# Patient Record
Sex: FEMALE | Race: WHITE | NOT HISPANIC OR LATINO | Employment: FULL TIME | URBAN - METROPOLITAN AREA
[De-identification: names, ages, dates, MRNs, and addresses within clinical notes are randomized per-mention and may not be internally consistent; named-entity substitution may affect disease eponyms.]

---

## 2019-01-28 ENCOUNTER — APPOINTMENT (OUTPATIENT)
Dept: RADIOLOGY | Facility: CLINIC | Age: 34
End: 2019-01-28
Payer: OTHER MISCELLANEOUS

## 2019-01-28 ENCOUNTER — TRANSCRIBE ORDERS (OUTPATIENT)
Dept: URGENT CARE | Facility: CLINIC | Age: 34
End: 2019-01-28

## 2019-01-28 ENCOUNTER — APPOINTMENT (OUTPATIENT)
Dept: URGENT CARE | Facility: CLINIC | Age: 34
End: 2019-01-28
Payer: OTHER MISCELLANEOUS

## 2019-01-28 DIAGNOSIS — S29.9XXA CHEST INJURY, INITIAL ENCOUNTER: Primary | ICD-10-CM

## 2019-01-28 DIAGNOSIS — S29.9XXA CHEST INJURY, INITIAL ENCOUNTER: ICD-10-CM

## 2019-01-28 PROCEDURE — G0382 LEV 3 HOSP TYPE B ED VISIT: HCPCS | Performed by: EMERGENCY MEDICINE

## 2019-01-28 PROCEDURE — 71120 X-RAY EXAM BREASTBONE 2/>VWS: CPT

## 2019-01-28 PROCEDURE — 99283 EMERGENCY DEPT VISIT LOW MDM: CPT | Performed by: EMERGENCY MEDICINE

## 2019-02-02 ENCOUNTER — APPOINTMENT (OUTPATIENT)
Dept: URGENT CARE | Facility: CLINIC | Age: 34
End: 2019-02-02
Payer: OTHER MISCELLANEOUS

## 2019-02-02 PROCEDURE — 99213 OFFICE O/P EST LOW 20 MIN: CPT | Performed by: EMERGENCY MEDICINE

## 2021-09-13 ENCOUNTER — NURSE TRIAGE (OUTPATIENT)
Dept: OTHER | Facility: OTHER | Age: 36
End: 2021-09-13

## 2021-09-13 DIAGNOSIS — U07.1 COVID-19: Primary | ICD-10-CM

## 2021-09-13 NOTE — TELEPHONE ENCOUNTER
Regarding: Having S/S of Covid  ----- Message from Di Haas RN sent at 9/13/2021  5:16 AM EDT -----  I been exposed to Covid

## 2021-09-13 NOTE — TELEPHONE ENCOUNTER
Reason for Disposition   [1] COVID-19 infection suspected by caller or triager AND [2] mild symptoms (cough, fever, or others) AND [0] no complications or SOB    Answer Assessment - Initial Assessment Questions  1  COVID-19 DIAGNOSIS: "Who made your Coronavirus (COVID-19) diagnosis?" "Was it confirmed by a positive lab test?" If not diagnosed by a HCP, ask "Are there lots of cases (community spread) where you live?" (See public health department website, if unsure)      No one  2  COVID-19 EXPOSURE: "Was there any known exposure to COVID before the symptoms began?" [facility] Definition of close contact: within 6 feet (2 meters) for a total of 15 minutes or more over a 24-hour period  Yes   3  ONSET: "When did the COVID-19 symptoms start?"       Friday   4  WORST SYMPTOM: "What is your worst symptom?" (e g , cough, fever, shortness of breath, muscle aches)      fever  5  COUGH: "Do you have a cough?" If Yes, ask: "How bad is the cough?"        no  6  FEVER: "Do you have a fever?" If Yes, ask: "What is your temperature, how was it measured, and when did it start?"      102 0  7  RESPIRATORY STATUS: "Describe your breathing?" (e g , shortness of breath, wheezing, unable to speak)       WNL  8  BETTER-SAME-WORSE: "Are you getting better, staying the same or getting worse compared to yesterday?"  If getting worse, ask, "In what way?"      Worse  9  HIGH RISK DISEASE: "Do you have any chronic medical problems?" (e g , asthma, heart or lung disease, weak immune system, obesity, etc )      DM type 2  10  PREGNANCY: "Is there any chance you are pregnant?" "When was your last menstrual period?"        no  11   OTHER SYMPTOMS: "Do you have any other symptoms?"  (e g , chills, fatigue, headache, loss of smell or taste, muscle pain, sore throat; new loss of smell or taste especially support the diagnosis of COVID-19)        N/V/D, body and muscle aches    Protocols used: CORONAVIRUS (COVID-19) DIAGNOSED OR SUSPECTED-ADULT-AH

## 2023-12-07 ENCOUNTER — OFFICE VISIT (OUTPATIENT)
Age: 38
End: 2023-12-07
Payer: COMMERCIAL

## 2023-12-07 ENCOUNTER — APPOINTMENT (OUTPATIENT)
Age: 38
End: 2023-12-07
Payer: COMMERCIAL

## 2023-12-07 VITALS
HEIGHT: 70 IN | HEART RATE: 115 BPM | TEMPERATURE: 95.3 F | DIASTOLIC BLOOD PRESSURE: 100 MMHG | SYSTOLIC BLOOD PRESSURE: 162 MMHG | BODY MASS INDEX: 41.95 KG/M2 | WEIGHT: 293 LBS | RESPIRATION RATE: 16 BRPM | OXYGEN SATURATION: 96 %
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
## 2023-12-07 DIAGNOSIS — S69.92XA LEFT WRIST INJURY, INITIAL ENCOUNTER: ICD-10-CM

## 2023-12-07 DIAGNOSIS — S63.502A LEFT WRIST SPRAIN, INITIAL ENCOUNTER: ICD-10-CM

## 2023-12-07 DIAGNOSIS — S70.02XA CONTUSION OF LEFT HIP, INITIAL ENCOUNTER: ICD-10-CM

## 2023-12-07 DIAGNOSIS — S79.912A HIP INJURY, LEFT, INITIAL ENCOUNTER: ICD-10-CM

## 2023-12-07 DIAGNOSIS — S46.912A SHOULDER STRAIN, LEFT, INITIAL ENCOUNTER: Primary | ICD-10-CM

## 2023-12-07 DIAGNOSIS — M25.512 ACUTE PAIN OF LEFT SHOULDER: ICD-10-CM

## 2023-12-07 PROCEDURE — 73030 X-RAY EXAM OF SHOULDER: CPT

## 2023-12-07 PROCEDURE — G0382 LEV 3 HOSP TYPE B ED VISIT: HCPCS | Performed by: PHYSICIAN ASSISTANT

## 2023-12-07 PROCEDURE — 73110 X-RAY EXAM OF WRIST: CPT

## 2023-12-07 PROCEDURE — 73502 X-RAY EXAM HIP UNI 2-3 VIEWS: CPT

## 2023-12-07 RX ORDER — MIRTAZAPINE 45 MG/1
45 TABLET, FILM COATED ORAL EVERY EVENING
COMMUNITY

## 2023-12-07 RX ORDER — CLONAZEPAM 1 MG/1
TABLET ORAL
COMMUNITY

## 2023-12-07 RX ORDER — ZOLPIDEM TARTRATE 10 MG/1
10 TABLET ORAL
COMMUNITY
Start: 2023-10-25

## 2023-12-07 RX ORDER — PAROXETINE HYDROCHLORIDE 40 MG/1
TABLET, FILM COATED ORAL
COMMUNITY

## 2023-12-07 RX ORDER — PRAZOSIN HYDROCHLORIDE 2 MG/1
CAPSULE ORAL
COMMUNITY

## 2023-12-07 RX ORDER — PAROXETINE 10 MG/1
TABLET, FILM COATED ORAL
COMMUNITY

## 2023-12-07 NOTE — PATIENT INSTRUCTIONS
Shoulder Sprain   WHAT YOU NEED TO KNOW:   A shoulder sprain happens when a ligament in your shoulder is stretched or torn. Ligaments are the tough tissues that connect bones. Ligaments allow you to lift, lower, and rotate your arm. DISCHARGE INSTRUCTIONS:   Return to the emergency department if:   You feel severe pain in your shoulder when you move it, or it is touched. Your skin feels cold or clammy. You have numbness, tingling, or a feeling of pins and needles in your shoulder. The skin on your injured shoulder looks blue or pale. Call your doctor if:   You have new or increased swelling and pain in your shoulder. You have new or increased stiffness when you move your injured shoulder. Your symptoms do not improve within 5 to 7 days. You have questions or concerns about your condition or care. Medicines: You may need any of the following:  Acetaminophen  decreases pain and fever. It is available without a doctor's order. Ask how much to take and how often to take it. Follow directions. Read the labels of all other medicines you are using to see if they also contain acetaminophen, or ask your doctor or pharmacist. Acetaminophen can cause liver damage if not taken correctly. NSAIDs , such as ibuprofen, help decrease swelling, pain, and fever. This medicine is available with or without a doctor's order. NSAIDs can cause stomach bleeding or kidney problems in certain people. If you take blood thinner medicine, always ask your healthcare provider if NSAIDs are safe for you. Always read the medicine label and follow directions. Prescription pain medicine  may be given. Ask your healthcare provider how to take this medicine safely. Some prescription pain medicines contain acetaminophen. Do not take other medicines that contain acetaminophen without talking to your healthcare provider. Too much acetaminophen may cause liver damage. Prescription pain medicine may cause constipation. Ask your healthcare provider how to prevent or treat constipation. Take your medicine as directed. Contact your healthcare provider if you think your medicine is not helping or if you have side effects. Tell your provider if you are allergic to any medicine. Keep a list of the medicines, vitamins, and herbs you take. Include the amounts, and when and why you take them. Bring the list or the pill bottles to follow-up visits. Carry your medicine list with you in case of an emergency. Follow up with your doctor as directed:  Write down your questions so you remember to ask them during your visits. Self-care:   Rest  your shoulder so it can heal. Avoid moving your shoulder as your injury heals. This will help decrease the risk of more damage to your shoulder. Apply ice  on your shoulder for 20 to 30 minutes every 2 hours or as directed. Use an ice pack, or put crushed ice in a plastic bag. Cover it with a towel before you apply it to your shoulder. Ice helps prevent tissue damage and decreases swelling and pain. Compress your shoulder as directed. Compression provides support and helps decrease swelling and movement so your shoulder can heal. For mild sprains, you may be given a sling to support your arm. You may need a padded brace or a plaster cast to hold your shoulder in place if the sprain is more serious. How to wear a brace, sling, or splint:  A brace, sling, or splint may be needed to limit your movement and protect your injured shoulder. Wear your brace, sling, or splint as directed. You may need to wear it all the time and take it off only to bathe or do exercises. Ask your healthcare provider how long you should wear it. Keep your skin clean and dry. Padding under your armpit will help absorb sweat and prevent sores on your skin. Do not hunch your shoulders. This may cause pain. Keep your shoulders relaxed.     Position the sling over your arm and hand so that it also covers your knuckles. This will help the sling support your wrist and hand. Position your wrist higher than your elbow. Your wrist may start to hurt or go numb if your sling is too short. Physical therapy:  A physical therapist teaches you exercises to help improve movement and strength, and to decrease pain. Prevent another injury:   Do not exercise when you are tired or in pain. Warm up and stretch before you exercise. Wear equipment to protect yourself when you play sports. Wear shoes that fit well and run on flat surfaces to prevent falls. © Copyright Feliciano Mathur 2023 Information is for End User's use only and may not be sold, redistributed or otherwise used for commercial purposes. The above information is an  only. It is not intended as medical advice for individual conditions or treatments. Talk to your doctor, nurse or pharmacist before following any medical regimen to see if it is safe and effective for you. Wrist Sprain   WHAT YOU NEED TO KNOW:   A wrist sprain happens when one or more ligaments in your wrist stretch or tear. Ligaments are tough tissues that connect bones and keep them in place, and support your joints. DISCHARGE INSTRUCTIONS:   Return to the emergency department if:   You have severe pain or swelling. Your injured wrist is red or has red streaks spreading from the injured area. You have new trouble moving your hands, fingers, or wrist.    Your wrist, hand, or fingers feel cold or numb. Your fingernails turn blue or gray. Call your doctor if:   Your symptoms get worse. You have pain and swelling for more than 48 hours. You have questions or concerns about your condition or care. Medicines: You may need any of the following:  NSAIDs , such as ibuprofen, help decrease swelling, pain, and fever. NSAIDs can cause stomach bleeding or kidney problems in certain people.  If you take blood thinner medicine, always ask your healthcare provider if NSAIDs are safe for you. Always read the medicine label and follow directions. Acetaminophen  decreases pain and fever. It is available without a doctor's order. Ask how much to take and how often to take it. Follow directions. Read the labels of all other medicines you are using to see if they also contain acetaminophen, or ask your doctor or pharmacist. Acetaminophen can cause liver damage if not taken correctly. Take your medicine as directed. Contact your healthcare provider if you think your medicine is not helping or if you have side effects. Tell your provider if you are allergic to any medicine. Keep a list of the medicines, vitamins, and herbs you take. Include the amounts, and when and why you take them. Bring the list or the pill bottles to follow-up visits. Carry your medicine list with you in case of an emergency. Self-care:   Rest  your wrist for at least 48 hours. Avoid activities that cause pain. Ice  your wrist for 15 to 20 minutes every hour or as directed. Use an ice pack, or put crushed ice in a plastic bag. Cover it with a towel before you put it on your wrist. Ice helps prevent tissue damage and decreases swelling and pain. Compress  your wrist with an elastic bandage. This will help decrease swelling, support your wrist, and help it heal. Wear your wrist wrap as directed. The elastic bandage should be snug but not tight. Elevate  your wrist above the level of your heart as often as you can. This will help decrease swelling and pain. Prop your wrist on pillows or blankets to keep it elevated comfortably. Wrist support: You may need to wear a splint or cast to support your wrist and prevent more damage. Wear your splint as directed. Ask for instructions on how to bathe while you are wearing a splint or cast.  Physical therapy:  Your healthcare provider may recommend that you go to physical therapy.  A physical therapist teaches you exercises to help improve movement and strength, and to decrease pain. Follow up with your doctor as directed:  Write down your questions so you remember to ask them during your visits. © Copyright Dani Lindsay 2023 Information is for End User's use only and may not be sold, redistributed or otherwise used for commercial purposes. The above information is an  only. It is not intended as medical advice for individual conditions or treatments. Talk to your doctor, nurse or pharmacist before following any medical regimen to see if it is safe and effective for you. Hip Contusion   WHAT YOU NEED TO KNOW:   A hip contusion is a bruise that appears on the skin of your hip after an injury. A bruise happens when small blood vessels tear but the skin does not. When blood vessels tear, blood leaks into nearby tissue, such as soft tissue or muscle. DISCHARGE INSTRUCTIONS:   Return to the emergency department if:   You have severe pain in your hip. You have numbness in your leg or toes. You cannot put any weight on or move your hip. Call your doctor if:   Your pain does not decrease, even after treatment. You have questions or concerns about your condition or care. Medicines:   NSAIDs , such as ibuprofen, help decrease swelling, pain, and fever. This medicine is available with or without a doctor's order. NSAIDs can cause stomach bleeding or kidney problems in certain people. If you take blood thinner medicine, always ask if NSAIDs are safe for you. Always read the medicine label and follow directions. Do not give these medicines to children younger than 6 months without direction from a healthcare provider. Take your medicine as directed. Contact your healthcare provider if you think your medicine is not helping or if you have side effects. Tell your provider if you are allergic to any medicine. Keep a list of the medicines, vitamins, and herbs you take. Include the amounts, and when and why you take them.  Bring the list or the pill bottles to follow-up visits. Carry your medicine list with you in case of an emergency. Self-care:   Rest  your injured hip so that it can heal. You may need to avoid putting any weight on your hip for at least 48 hours. Return to normal activities as directed. Ice  the injury for 20 minutes every 4 hours, or as directed. Use an ice pack, or put crushed ice in a plastic bag. Cover it with a towel to protect your skin. Ice helps prevent tissue damage and decreases swelling and pain. Compress  your injury with an elastic bandage to reduce swelling. Ask your healthcare provider how to use an elastic bandage and how tight it should be. Elevate  your injured hip above the level of your heart as often as you can. This will help decrease swelling and pain. If possible, prop your hip and leg on pillows or blankets to keep it elevated comfortably. Help your hip contusion heal:   Do not massage or use heat. Heat and massage may slow healing of the area. Do not stretch injured muscles. Ask your healthcare provider when and how you may safely stretch after your injury. Do not drink alcohol. Alcohol may slow healing of your injury. Prevent another contusion:   Stretch and warm up before you play sports or exercise. Wear protective gear when you play sports. If you begin a new physical activity, start slowly to give your body a chance to adjust.    Follow up with your doctor as directed: You may need to return within a week to recheck your injury. Write down any questions you have so you remember to ask them during your visits. © Copyright Geetha Ballard 2023 Information is for End User's use only and may not be sold, redistributed or otherwise used for commercial purposes. The above information is an  only. It is not intended as medical advice for individual conditions or treatments.  Talk to your doctor, nurse or pharmacist before following any medical regimen to see if it is safe and effective for you.

## 2023-12-11 ENCOUNTER — OFFICE VISIT (OUTPATIENT)
Age: 38
End: 2023-12-11
Payer: COMMERCIAL

## 2023-12-11 VITALS
WEIGHT: 293 LBS | OXYGEN SATURATION: 95 % | BODY MASS INDEX: 41.95 KG/M2 | RESPIRATION RATE: 18 BRPM | SYSTOLIC BLOOD PRESSURE: 164 MMHG | HEIGHT: 70 IN | TEMPERATURE: 95.7 F | DIASTOLIC BLOOD PRESSURE: 96 MMHG | HEART RATE: 115 BPM

## 2023-12-11 DIAGNOSIS — S46.912D LEFT SHOULDER STRAIN, SUBSEQUENT ENCOUNTER: Primary | ICD-10-CM

## 2023-12-11 DIAGNOSIS — Z75.8 DOES NOT HAVE PRIMARY CARE PROVIDER: ICD-10-CM

## 2023-12-11 DIAGNOSIS — R20.0 LEFT LEG NUMBNESS: ICD-10-CM

## 2023-12-11 PROCEDURE — G0382 LEV 3 HOSP TYPE B ED VISIT: HCPCS

## 2023-12-11 RX ORDER — CYCLOBENZAPRINE HCL 5 MG
5 TABLET ORAL 3 TIMES DAILY PRN
Qty: 20 TABLET | Refills: 0 | Status: SHIPPED | OUTPATIENT
Start: 2023-12-11

## 2023-12-11 RX ORDER — MELOXICAM 7.5 MG/1
7.5 TABLET ORAL DAILY
Qty: 30 TABLET | Refills: 0 | Status: SHIPPED | OUTPATIENT
Start: 2023-12-11 | End: 2023-12-21

## 2023-12-11 NOTE — PROGRESS NOTES
[facility]        NAME: Abhijeet Strong is a 41 y.o. female  : 1985    MRN: [id number]  DATE: 2023  TIME: 9:24 AM    Assessment and Plan   Shoulder strain, left, initial encounter [S46.912A]  1. Shoulder strain, left, initial encounter  XR shoulder 2+ vw left      2. Left wrist sprain, initial encounter  XR wrist 3+ vw left      3. Contusion of left hip, initial encounter  XR hip/pelv 2-3 vws left if performed      4. Injury due to fall, initial encounter              Patient Instructions     Pt has suffered fall with strains/sprains to her left shoulder and wrist as well as contusion of the left hip. Her X-rays are all negative for any acute abnormalities. She was given a sling to use for comfort as well as ACE wrap for her left wrist. Rec ice, rest, OTC analgesics. Rec reevaluation 1-2 wks if no significant improvement with conservative treatment. Follow up with PCP in 3-5 days. Proceed to  ER if symptoms worsen. Chief Complaint     Chief Complaint   Patient presents with    Mary Stands down the steps around 5:30am. C/o pain on left side including, shoulder blade, elbow, wrist and has numbness in 2rd through 5th digits. Also has pain in left hip that radiates to knee. Has neck pain when she turns her head to the left. Denies hitting head and no LOC. History of Present Illness       Pt presents after falling while walking down stairs and suffering multiple injuries. When she fell, she grabbed onto the railing and held on and now has pain in her left shoulder, elbow, wrist, numbness in her left 2nd-5th fingers, as well as pain in the left side of her neck and shoulder blade as well as her left hip. Denies head trauma/LOC. The fall occurred early this morning. Fall  Associated symptoms include numbness (Left 2nd-5th fingers). Review of Systems   Review of Systems   Constitutional: Negative. Respiratory: Negative. Cardiovascular: Negative. Gastrointestinal: Negative. Genitourinary: Negative. Musculoskeletal:  Positive for neck pain. Left shoulder, elbow, wrist, hip pain S/P fall with multiple injuries   Neurological:  Positive for numbness (Left 2nd-5th fingers). Current Medications       Current Outpatient Medications:     clonazePAM (KlonoPIN) 1 mg tablet, TAKE 1 TAB TWICE DAILY AS NEEDED FOR ANXIETY WITH AN ADDITIONAL 0.5 TAB DAILY AS NEEDED., Disp: , Rfl:     mirtazapine (REMERON) 45 MG tablet, Take 45 mg by mouth every evening, Disp: , Rfl:     PARoxetine (PAXIL) 10 mg tablet, TAKE 1 TABLET (10 MG TOTAL) BY MOUTH EVERY MORNING. WITH 40 MG TABLET., Disp: , Rfl:     PARoxetine (PAXIL) 40 MG tablet, TAKE 1 TABLET (40 MG TOTAL) BY MOUTH NIGHTLY. WITH 10 MG TABLET., Disp: , Rfl:     prazosin (MINIPRESS) 2 mg capsule, TAKE 1 CAPSULE BY MOUTH EVERY DAY AT NIGHT, Disp: , Rfl:     zolpidem (AMBIEN) 10 mg tablet, Take 10 mg by mouth, Disp: , Rfl:     Current Allergies     Allergies as of 2023 - Reviewed 2023   Allergen Reaction Noted    Apple - food allergy Throat Swelling 2010    Daucus carota Swelling and Throat Swelling 2010            The following portions of the patient's history were reviewed and updated as appropriate: allergies, current medications, past family history, past medical history, past social history, past surgical history and problem list.     Past Medical History:   Diagnosis Date    Hypertension        Past Surgical History:   Procedure Laterality Date     SECTION         Family History   Problem Relation Age of Onset    No Known Problems Mother          Medications have been verified. Objective   /100   Pulse (!) 115   Temp (!) 95.3 °F (35.2 °C)   Resp 16   Ht 5' 10" (1.778 m)   Wt (!) 159 kg (350 lb)   LMP 2023 (Exact Date) Comment: male sterilization  SpO2 96%   BMI 50.22 kg/m²   Patient's last menstrual period was 2023 (exact date). Physical Exam     Physical Exam  Vitals reviewed. Constitutional:       General: She is not in acute distress. Appearance: She is well-developed. HENT:      Head: Normocephalic and atraumatic. Musculoskeletal:      Cervical back: Normal range of motion and neck supple. Tenderness (TTP over the left lateral cervical paraspinals into left upper trapezius. No cervical midline tenderness) present. Comments: TTP over the left shoulder glenohumeral joint worsened with PROM which is fully intact. No crepitus or sign of instability noted. No visible/palpable swelling, ecchymosis, or deformity. No significant spasm of surrounding musculature. Diffuse left wrist TTP and mild STS but no ecchymosis or deformity. ROM fully intact but worsens pain. TTP over the left hip joint but ROM fully intact with no crepitus or sign of instability and no visible swelling/ecchymosis/deformity. Left elbow exam is overall benign. Mild TTP but full ROM without swelling/ecchymosis/deformity. Neurological:      Mental Status: She is alert and oriented to person, place, and time. Sensory: No sensory deficit. Motor: No weakness.

## 2023-12-11 NOTE — PROGRESS NOTES
[facility]        NAME: Anju Chicas is a 41 y.o. female  : 1985    MRN: [id number]  DATE: 2023  TIME: 10:44 AM    Assessment and Plan   Left shoulder strain, subsequent encounter [S49.563P]  1. Left shoulder strain, subsequent encounter  Ambulatory Referral to Orthopedic Surgery    meloxicam (Mobic) 7.5 mg tablet    cyclobenzaprine (FLEXERIL) 5 mg tablet      2. Left leg numbness  Ambulatory Referral to Orthopedic Surgery    meloxicam (Mobic) 7.5 mg tablet    cyclobenzaprine (FLEXERIL) 5 mg tablet      3. Does not have primary care provider  [facility] Ref[facility]            Patient Instructions   Take Meloxicam instead of Ibuprofen  Ice to affected area first 48 hours, heat afterwards. 15 minutes every 3 hours as needed throughout the day  Topical pain medication such as icy/hot, Biofreeze, Salon pas, etc.  Acetaminophen - 325-1000 mg orally every 4-6 hours when required, maximum 4000 mg/day    Follow up with Orthopedics regarding left shoulder not getting better, also left leg numbness starting yesterday. Continue appointment with Srinivas Handy that you have scheduled to address high blood pressure readings. Chief Complaint     Chief Complaint   Patient presents with   • Shoulder Pain     Fell last Thursday down steps. Still experiencing pain in left shoulder. Had an xray last visit and has been wearing a sling since then. Also having constant numbness from left hip to left knee on lateral side. Taking ibuprofen, icing. Had to go back to work today and using her arm hurt too much, she has no restrictive duties at this time. History of Present Illness       Mayda Gold down steps 4 days ago, was seen here and had imaging. Still having left shoulder pain. Numbness to left hip and left lateral knee starting yesterday. Has been using sling and occasionally moving shoulder as tolerated.  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 Has been icing and taking Ibuprofen. Review of Systems   Review of Systems   Constitutional:  Negative for fever. Respiratory:  Negative for cough and shortness of breath. Cardiovascular:  Negative for chest pain, palpitations and leg swelling. Gastrointestinal:  Negative for abdominal pain, diarrhea, nausea and vomiting. Musculoskeletal:  Positive for arthralgias (left shoulder) and myalgias. Neurological:  Positive for numbness (Left leg). Current Medications       Current Outpatient Medications:   •  clonazePAM (KlonoPIN) 1 mg tablet, TAKE 1 TAB TWICE DAILY AS NEEDED FOR ANXIETY WITH AN ADDITIONAL 0.5 TAB DAILY AS NEEDED., Disp: , Rfl:   •  cyclobenzaprine (FLEXERIL) 5 mg tablet, Take 1 tablet (5 mg total) by mouth 3 (three) times a day as needed for muscle spasms, Disp: 20 tablet, Rfl: 0  •  meloxicam (Mobic) 7.5 mg tablet, Take 1 tablet (7.5 mg total) by mouth daily Can take up to 2 tablets total per day, Disp: 30 tablet, Rfl: 0  •  mirtazapine (REMERON) 45 MG tablet, Take 45 mg by mouth every evening, Disp: , Rfl:   •  PARoxetine (PAXIL) 10 mg tablet, TAKE 1 TABLET (10 MG TOTAL) BY MOUTH EVERY MORNING. WITH 40 MG TABLET., Disp: , Rfl:   •  PARoxetine (PAXIL) 40 MG tablet, TAKE 1 TABLET (40 MG TOTAL) BY MOUTH NIGHTLY.  WITH 10 MG TABLET., Disp: , Rfl:   •  prazosin (MINIPRESS) 2 mg capsule, TAKE 1 CAPSULE BY MOUTH EVERY DAY AT NIGHT, Disp: , Rfl:   •  zolpidem (AMBIEN) 10 mg tablet, Take 10 mg by mouth, Disp: , Rfl:     Current Allergies     Allergies as of 12/11/2023 - Reviewed 12/11/2023   Allergen Reaction Noted   • Apple - food allergy Throat Swelling 05/07/2010   • Daucus carota Swelling and Throat Swelling 05/07/2010            The following portions of the patient's history were reviewed and updated as appropriate: allergies, current medications, past family history, past medical history, past social history, past surgical history and problem list.     Past Medical History:   Diagnosis Date   • Hypertension        Past Surgical History:   Procedure Laterality Date   •  SECTION         Family History   Problem Relation Age of Onset   • No Known Problems Mother          Medications have been verified. Objective   /96 Comment: manual  Pulse (!) 115   Temp (!) 95.7 °F (35.4 °C)   Resp 18   Ht 5' 10" (1.778 m)   Wt (!) 166 kg (365 lb 8.4 oz)   LMP 2023 (Exact Date) Comment: male sterilization  SpO2 95%   BMI 52.45 kg/m²   Patient's last menstrual period was 2023 (exact date). Physical Exam     Physical Exam  Vitals and nursing note reviewed. Constitutional:       Appearance: Normal appearance. HENT:      Head: Normocephalic and atraumatic. Pulmonary:      Effort: Pulmonary effort is normal.   Musculoskeletal:        Arms:       Comments: Numbness from left hip laterally down to left knee starting yesterday. No loss of bowel or bladder control. Skin:     General: Skin is warm and dry. Capillary Refill: Capillary refill takes less than 2 seconds. Neurological:      General: No focal deficit present. Mental Status: She is alert and oriented to person, place, and time. Mental status is at baseline. Sensory: No sensory deficit. Motor: No weakness. Psychiatric:         Mood and Affect: Mood normal.         Behavior: Behavior normal.         Thought Content:  Thought content normal.

## 2023-12-11 NOTE — PATIENT INSTRUCTIONS
Take Meloxicam instead of Ibuprofen  Ice to affected area first 48 hours, heat afterwards. 15 minutes every 3 hours as needed throughout the day  Topical pain medication such as icy/hot, Biofreeze, Salon pas, etc.  Acetaminophen - 325-1000 mg orally every 4-6 hours when required, maximum 4000 mg/day    Follow up with Orthopedics regarding left shoulder not getting better, also left leg numbness starting yesterday. Continue appointment with Alethea Seip that you have scheduled to address high blood pressure readings.

## 2023-12-11 NOTE — LETTER
December 11, 2023     Patient: Miguel Birmingham   YOB: 1985   Date of Visit: 12/11/2023       To Whom It May Concern: It is my medical opinion that Miguel Birmingham may return to light duty immediately with the following restrictions: no use of left arm until seen by Orthopedics. If you have any questions or concerns, please don't hesitate to call.          Sincerely,        EDMOND Pérez    CC: No Recipients

## 2023-12-11 NOTE — LETTER
December 11, 2023     Patient: Aline Ritchie   YOB: 1985   Date of Visit: 12/11/2023       To Whom it May Concern:    Aline Ritchie was seen in my clinic on 12/11/2023. She {Return to school/sport:88004}. If you have any questions or concerns, please don't hesitate to call.          Sincerely,          OLGA ROWLAND        CC:   No Recipients

## 2023-12-12 PROBLEM — N92.6 IRREGULAR MENSTRUAL CYCLE: Status: ACTIVE | Noted: 2023-12-12

## 2023-12-12 PROBLEM — I10 BENIGN ESSENTIAL HYPERTENSION: Status: ACTIVE | Noted: 2019-04-22

## 2023-12-12 PROBLEM — E78.2 MIXED HYPERLIPIDEMIA: Status: ACTIVE | Noted: 2021-03-31

## 2023-12-12 PROBLEM — E55.9 VITAMIN D DEFICIENCY: Status: ACTIVE | Noted: 2019-11-25

## 2023-12-12 PROBLEM — G47.00 INSOMNIA: Status: ACTIVE | Noted: 2023-12-12

## 2023-12-12 PROBLEM — N92.0 MENORRHAGIA WITH REGULAR CYCLE: Status: ACTIVE | Noted: 2017-07-31

## 2023-12-12 PROBLEM — J45.909 EXTRINSIC ASTHMA WITHOUT STATUS ASTHMATICUS: Status: ACTIVE | Noted: 2019-04-22

## 2023-12-12 PROBLEM — F41.8 DEPRESSION WITH ANXIETY: Status: ACTIVE | Noted: 2023-12-12

## 2023-12-12 PROBLEM — E66.01 MORBID OBESITY (HCC): Status: ACTIVE | Noted: 2020-09-08

## 2023-12-13 ENCOUNTER — TELEPHONE (OUTPATIENT)
Dept: ADMINISTRATIVE | Facility: OTHER | Age: 38
End: 2023-12-13

## 2023-12-13 NOTE — TELEPHONE ENCOUNTER
----- Message from Phu Cantor, [facility] sent at 12/12/2023  1:34 PM EST -----  Regarding: Care Gap Request  12/12/23 1:34 PM    Hello, our patient attached above has had Pap Smear (HPV) aka Cervical Cancer Screening completed/performed. Please assist in updating the patient chart by pulling the document from the Care Everywhere Tab. The date of service is July 2020.      Thank you,  EDMOND Williamson  [facility]

## 2023-12-13 NOTE — TELEPHONE ENCOUNTER
Upon review of the In Basket request we were able to locate, review, and update the patient chart as requested for Pap Smear (HPV) aka Cervical Cancer Screening. Any additional questions or concerns should be emailed to the Practice Liaisons via the appropriate education email address, please do not reply via In Basket.     Thank you  Siobhan Loya MA

## 2023-12-21 ENCOUNTER — OFFICE VISIT (OUTPATIENT)
Age: 38
End: 2023-12-21
Payer: COMMERCIAL

## 2023-12-21 VITALS
HEIGHT: 70 IN | DIASTOLIC BLOOD PRESSURE: 99 MMHG | BODY MASS INDEX: 41.95 KG/M2 | HEART RATE: 99 BPM | WEIGHT: 293 LBS | SYSTOLIC BLOOD PRESSURE: 177 MMHG

## 2023-12-21 DIAGNOSIS — S43.422A SPRAIN OF LEFT ROTATOR CUFF CAPSULE, INITIAL ENCOUNTER: Primary | ICD-10-CM

## 2023-12-21 DIAGNOSIS — M25.511 RIGHT SHOULDER PAIN, UNSPECIFIED CHRONICITY: ICD-10-CM

## 2023-12-21 DIAGNOSIS — R20.0 LEFT LEG NUMBNESS: ICD-10-CM

## 2023-12-21 DIAGNOSIS — S46.912D LEFT SHOULDER STRAIN, SUBSEQUENT ENCOUNTER: ICD-10-CM

## 2023-12-21 PROCEDURE — 99203 OFFICE O/P NEW LOW 30 MIN: CPT | Performed by: ORTHOPAEDIC SURGERY

## 2023-12-21 RX ORDER — ESCITALOPRAM OXALATE 10 MG/1
TABLET ORAL
COMMUNITY

## 2023-12-21 RX ORDER — CLOTRIMAZOLE AND BETAMETHASONE DIPROPIONATE 10; .64 MG/G; MG/G
1 CREAM TOPICAL 2 TIMES DAILY
COMMUNITY
Start: 2023-08-04

## 2023-12-21 NOTE — PROGRESS NOTES
CHIEF COMPLAIN/REASON FOR VISIT  Chief Complaint   Patient presents with    Left Shoulder - Pain       HISTORY OF PRESENT ILLNESS  Roberta Glover is a RHD 38 y.o. female who presents for evaluation of their left shoulder. Patient was seen by Benjamín Danielle on  after she sustained a fall and tried to catch herself. The injury occurred on . It was reported when she fell she grabbed onto a railing to try to catch herself which caused the pain. She had x-rays done which showed no acute fracture or dislocation. Today, patient continues to experience pain and difficulties with ROM. She has been using the sling and on and off to help prevent a frozen shoulder.     REVIEW OF SYSTEMS  Review of systems was performed and, woutside that mentioned in the HPI, it was negative for symptomology related to the integumentary, hematologic, immunologic, allergic, neurologic, cardiovascular, respiratory, GI or  systems.     MEDICAL HISTORY  Patient Active Problem List   Diagnosis    Asthma    Essential hypertension    Depression with anxiety    Diabetes mellitus (HCC)    Insomnia    Irregular menstrual cycle    Menorrhagia with regular cycle    Metabolic syndrome    Mixed hyperlipidemia    Morbid obesity (HCC)    Vitamin D deficiency    Extrinsic asthma without status asthmaticus       SURGICAL HISTORY  Past Surgical History:   Procedure Laterality Date     SECTION         CURRENT MEDICATIONS    Current Outpatient Medications:     clotrimazole-betamethasone (LOTRISONE) 1-0.05 % cream, Apply 1 application. topically 2 (two) times a day, Disp: , Rfl:     cyclobenzaprine (FLEXERIL) 5 mg tablet, Take 1 tablet (5 mg total) by mouth 3 (three) times a day as needed for muscle spasms, Disp: 20 tablet, Rfl: 0    escitalopram (LEXAPRO) 10 mg tablet, , Disp: , Rfl:     prazosin (MINIPRESS) 2 mg capsule, TAKE 1 CAPSULE BY MOUTH EVERY DAY AT NIGHT, Disp: , Rfl:     zolpidem (AMBIEN) 10 mg tablet, Take 10 mg by mouth,  "Disp: , Rfl:     clonazePAM (KlonoPIN) 1 mg tablet, TAKE 1 TAB TWICE DAILY AS NEEDED FOR ANXIETY WITH AN ADDITIONAL 0.5 TAB DAILY AS NEEDED., Disp: , Rfl:     SOCIAL HISTORY  Social History     Socioeconomic History    Marital status: /Civil Union     Spouse name: Not on file    Number of children: Not on file    Years of education: Not on file    Highest education level: Not on file   Occupational History    Not on file   Tobacco Use    Smoking status: Never     Passive exposure: Never    Smokeless tobacco: Never   Vaping Use    Vaping status: Never Used   Substance and Sexual Activity    Alcohol use: Yes     Comment: occasional    Drug use: Never    Sexual activity: Not on file   Other Topics Concern    Not on file   Social History Narrative    Not on file     Social Determinants of Health     Financial Resource Strain: Not on file   Food Insecurity: Not on file   Transportation Needs: Not on file   Physical Activity: Not on file   Stress: Not on file   Social Connections: Not on file   Intimate Partner Violence: Not on file   Housing Stability: Not on file       Objective     VITAL SIGNS  BP (!) 177/99   Pulse 99   Ht 5' 10\" (1.778 m)   Wt (!) 163 kg (360 lb)   LMP 12/06/2023 (Exact Date) Comment: male sterilization  BMI 51.65 kg/m²      PHYSICAL EXAM  General:   Well-appearing  No acute distress  Appears stated age    Left Shoulder  Shoulder abduction 120 / 180  Shoulder forward flexion 120 / 180  Shoulder internal rotation T7/T7  Supraspinatus/ABD 5/5   Infraspinatus/ER 5/5   Negative Belly Press/SS  Positive Neer  Positive Cox  Positive O'briens  Skin is intact with no erythema, warmth or drainage  Motor strength intact distally  Sensation to light touch is normal in the axillary, radial, ulnar, and median nerve distributions.  Fingers warm and perfused    RADIOGRAPHIC EXAMINATION/DIAGNOSTICS:  Procedure: XR hip/pelv 2-3 vws left if performed    Result Date: 12/8/2023  Narrative: LEFT HIP " INDICATION: Fall down stairs with subsequent left hip pain. COMPARISON:  None VIEWS:  XR HIP/PELV 2-3 VWS LEFT  W PELVIS IF PERFORMED Images: 3 FINDINGS: There is no acute fracture or dislocation. No significant hip degenerative changes. No lytic or blastic osseous lesion. Soft tissues are unremarkable. The visualized lumbar spine is unremarkable.     Impression: No acute osseous abnormality. Resident: JOSSELYN AMANDA I, the attending radiologist, have reviewed the images and agree with the final report above. Workstation performed: [id number]     Procedure: XR wrist 3+ vw left    Result Date: 12/8/2023  Narrative: LEFT WRIST INDICATION: Fell down the stairs with subsequent left wrist pain. COMPARISON:  None VIEWS:  XR WRIST 3+ VW LEFT Images: 5 FINDINGS: There is no acute fracture or dislocation. No significant degenerative changes. No lytic or blastic osseous lesion. Soft tissues are unremarkable.     Impression: No acute osseous abnormality. Resident: JOSSELYN AMANDA I, the attending radiologist, have reviewed the images and agree with the final report above. Workstation performed: [id number]     Procedure: XR shoulder 2+ vw left    Result Date: 12/8/2023  Narrative: LEFT SHOULDER INDICATION:   M25.512: Pain in left shoulder. COMPARISON:  None VIEWS:  XR SHOULDER 2+ VW LEFT FINDINGS: There is no acute fracture or dislocation. No significant degenerative changes. No lytic or blastic osseous lesion. Soft tissues are unremarkable.     Impression: No acute osseous abnormality. Workstation performed: [id number]      ASSESSMENT/PLAN:  Left shoulder pain; rotator cuff sprain    Today, we discussed the diagnosis of rotator cuff sprain. We discussed conservative treatment options including but are certainly not limited to: Rest, ice, compression, elevation, activity modification, anti-inflammatory medication, physical therapy, and/or injections.  We discussed benefits of each potential treatment option.  After  discussion, patient was agreeable to trying Rest, Ice, Compression, Elevation, Activity Modification, Anti-inflammatory Medication, and Physical Therapy. Advised using the sling as needed. Discussion had with patient about whether to pursue MRI. After discussion, patient would prefer to hold off on MRI at this time. Encouraged patient to let us know if his pain worsens or changes their mind and we would be happy to order this. We will plan to follow up with the patient as needed.  They are understanding that if the pain should worsen or they develop new symptoms to please reach out to us sooner. Patient is understanding and agreeable to this plan.           Scribe Attestation      I,:  Ozzy Shah PA-C am acting as a scribe while in the presence of the attending physician.:       I,:  Alex Tate MD personally performed the services described in this documentation    as scribed in my presence.:

## 2023-12-21 NOTE — LETTER
December 21, 2023     Patient: Roberta Glover  YOB: 1985  Date of Visit: 12/21/2023      To Whom it May Concern:    Roberta Glover is under my professional care. Roberta was seen in my office on 12/21/2023. Roberta may return to work on 12/21/23 with no restrictions. .    If you have any questions or concerns, please don't hesitate to call.         Sincerely,          Alex Tate MD        CC: No Recipients

## 2024-02-26 ENCOUNTER — TELEPHONE (OUTPATIENT)
Age: 39
End: 2024-02-26

## 2024-02-26 NOTE — LETTER
[facility]  [address]  [address]  [place] [address]  Phone#  [phone]  Fax#  [phone]      March 5, 2024      Dear:   Roberta Glover         Our office has attempted to contact you several times regarding your Appointment.  Could you please contact our office at [phone].    Thank you.     Sincerely,    Fay Colindres

## 2024-02-29 ENCOUNTER — TELEPHONE (OUTPATIENT)
Dept: OBGYN CLINIC | Facility: HOSPITAL | Age: 39
End: 2024-02-29

## 2024-02-29 NOTE — TELEPHONE ENCOUNTER
Caller: Estefania Bond     Doctor: Bebeto    Reason for call: We received FMLA forms for the patient on 1/20/24 but I don't see completed forms in the chart.  Please advise.    Call back#: [phone]

## 2024-03-05 NOTE — TELEPHONE ENCOUNTER
3rd attempt,Called and left message for patient to call back to reschedule appointment from 2/21/24.. Sending letter